# Patient Record
Sex: MALE | Race: AMERICAN INDIAN OR ALASKA NATIVE
[De-identification: names, ages, dates, MRNs, and addresses within clinical notes are randomized per-mention and may not be internally consistent; named-entity substitution may affect disease eponyms.]

---

## 2017-09-22 ENCOUNTER — HOSPITAL ENCOUNTER (EMERGENCY)
Dept: HOSPITAL 31 - C.ER | Age: 27
Discharge: HOME | End: 2017-09-22
Payer: SELF-PAY

## 2017-09-22 VITALS
DIASTOLIC BLOOD PRESSURE: 88 MMHG | SYSTOLIC BLOOD PRESSURE: 150 MMHG | HEART RATE: 59 BPM | OXYGEN SATURATION: 100 % | TEMPERATURE: 97.8 F | RESPIRATION RATE: 20 BRPM

## 2017-09-22 DIAGNOSIS — H10.9: Primary | ICD-10-CM

## 2017-09-22 NOTE — C.PDOC
History Of Present Illness


27 yr old male presents to the ER with complaints of right eye itchiness since 

yesterday. Patient states he woke up today with crusting and redness. Denies 

fever, vision changes, URI symtpoms, or headache. 


Time Seen by Provider: 09/22/17 15:21


Chief Complaint (Nursing): Eye Problem


History Per: Patient


History/Exam Limitations: no limitations


Onset/Duration Of Symptoms: Days (1)


Current Symptoms Are (Timing): Still Present





Past Medical History


Reviewed: Historical Data, Nursing Documentation, Vital Signs


Vital Signs: 


 Last Vital Signs











Temp  97.8 F   09/22/17 15:16


 


Pulse  59 L  09/22/17 15:16


 


Resp  20   09/22/17 15:16


 


BP  150/88   09/22/17 15:16


 


Pulse Ox  100   09/22/17 16:11











Family History: States: No Known Family Hx





- Social History


Hx Alcohol Use: No


Hx Substance Use: No





- Immunization History


Hx Tetanus Toxoid Vaccination: No


Hx Influenza Vaccination: No


Hx Pneumococcal Vaccination: No





Review Of Systems


Except As Marked, All Systems Reviewed And Found Negative.


Constitutional: Negative for: Fever, Chills


Eyes: Positive for: Redness, Other (Right eye itchiness ).  Negative for: 

Vision Change


Neurological: Negative for: Headache





Physical Exam





- Physical Exam


Appears: Non-toxic, No Acute Distress


Skin: Warm, Dry


Head: Atraumatic, Normacephalic


Eye(s): bilateral: PERRL, EOMI, right: Other (Mild injection. Purlent 

discharge. )


Nose: Normal


Oral Mucosa: Moist


Throat: Normal, No Erythema, No Exudate


Neck: Normal ROM, Supple


Chest: Symmetrical


Respiratory: No Accessory Muscle Use


Neurological/Psych: Oriented x3, Normal Speech





ED Course And Treatment


O2 Sat by Pulse Oximetry: 100 (RA)


Pulse Ox Interpretation: Normal


Progress Note: Patient is advised to follow up with optho in 1-2 days for 

further evaluation.





Disposition





- Disposition


Referrals: 


Vivek Neely MD [Staff Provider] - 


Disposition: HOME/ ROUTINE


Disposition Time: 15:27


Condition: STABLE


Additional Instructions: 


Follow up with primary medical doctor in 1-3 days without fail for further 

evaluation. Take medications as prescribed. Return to the emergency department 

at any time if symptoms persist or worsen.





Prescriptions: 


Tobramycin 0.3% [Tobramycin 5 Ml] 1 drop OP Q4 #1 bottle


Instructions:  Conjunctivitis (ED)


Forms:  [facility] (English)





- Clinical Impression


Clinical Impression: 


 Conjunctivitis








- PA / NP / Resident Statement


MD/DO has reviewed & agrees with the documentation as recorded.





- Scribe Statement


The provider has reviewed the documentation as recorded by the Scribe


Susan Wolfe





All medical record entries made by the Scribe were at my direction and 

personally dictated by me. I have reviewed the chart and agree that the record 

accurately reflects my personal performance of the history, physical exam, 

medical decision making, and the department course for this patient. I have 

also personally directed, reviewed, and agree with the discharge instructions 

and disposition.

## 2018-06-19 ENCOUNTER — HOSPITAL ENCOUNTER (EMERGENCY)
Dept: HOSPITAL 31 - C.ER | Age: 28
Discharge: HOME | End: 2018-06-19
Payer: COMMERCIAL

## 2018-06-19 VITALS
RESPIRATION RATE: 18 BRPM | SYSTOLIC BLOOD PRESSURE: 152 MMHG | TEMPERATURE: 97.8 F | OXYGEN SATURATION: 100 % | DIASTOLIC BLOOD PRESSURE: 79 MMHG | HEART RATE: 65 BPM

## 2018-06-19 DIAGNOSIS — S16.1XXA: Primary | ICD-10-CM
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
## 2018-06-19 PROCEDURE — 99283 EMERGENCY DEPT VISIT LOW MDM: CPT

## 2018-06-19 PROCEDURE — 96372 THER/PROPH/DIAG INJ SC/IM: CPT

## 2018-06-19 NOTE — C.PDOC
History Of Present Illness


27 year old male presents to the ED for evaluation of left-sided neck pain 

which gradually developed over the past 3 days. Patient admits he was holding 

my baby and my head was rotated and that he developed neck pain since then. Pt 

describes pain as localized over Left lateral neck area, worse with head 

rotation. Otherwise, Patient denies known direct trauma or injury, fever, chills

, headache, dizziness, vertigo, recent illness, chest pain, shortness of breath

, dyspnea, abd. pain, N/V, back pain, denies weakness, sensory or vascular 

deficits to B/L UEs. Ambulate to Ed for evaluation, not in nay apparent 

distress.


Time Seen by Provider: 06/19/18 13:08


Chief Complaint (Nursing): Upper Extremity Problem/Injury


History Per: Patient


History/Exam Limitations: no limitations


Onset/Duration Of Symptoms: Days (3), Gradual


Current Symptoms Are (Timing): Still Present


Quality: "Pain"


Additional History Per: Patient





Past Medical History


Reviewed: Historical Data, Nursing Documentation, Vital Signs


Vital Signs: 


 Last Vital Signs











Temp  97.8 F   06/19/18 13:02


 


Pulse  65   06/19/18 13:02


 


Resp  18   06/19/18 13:02


 


BP  152/79 H  06/19/18 13:02


 


Pulse Ox  100   06/19/18 14:14














- Medical History


PMH: No Chronic Diseases


Surgical History: No Surg Hx


Family History: States: Unknown Family Hx





- Social History


Hx Alcohol Use: No


Hx Substance Use: No





- Immunization History


Hx Tetanus Toxoid Vaccination: No


Hx Influenza Vaccination: No


Hx Pneumococcal Vaccination: No





Review Of Systems


Cardiovascular: Negative for: Chest Pain


Respiratory: Negative for: Shortness of Breath


Musculoskeletal: Positive for: Neck Pain (left-sided).  Negative for: Back Pain


Neurological: Negative for: Headache, Dizziness





Physical Exam





- Physical Exam


Appears: Well, Non-toxic, No Acute Distress


Skin: Normal Color, Warm, Dry, No Rash


Head: Normacephalic


Eye(s): bilateral: PERRL


Ear(s): Bilateral: Normal


Nose: No Flaring, No Discharge


Oral Mucosa: Moist, No Drooling


Throat: No Drooling


Neck: Normal ROM, Trachea Midline, No Midline Cervical Tenderness, No Step Off 

Deformity, Supple, Other (mild tenderness over left SCM muscle. No deformity, 

no skin changes.)


Chest: Symmetrical, No Deformity, No Tenderness


Cardiovascular: Rhythm Regular


Respiratory: No Decreased Breath Sounds, No Accessory Muscle Use, No Stridor, 

No Wheezing


Gastrointestinal/Abdominal: Soft, No Tenderness, No Distention, No Guarding


Back: No CVA Tenderness, No Vertebral Tenderness


Extremity: Normal ROM, No Deformity, No Swelling


Neurological/Psych: Oriented x3, Normal Speech, Normal Motor, Normal Sensation, 

Normal Reflexes





ED Course And Treatment


O2 Sat by Pulse Oximetry: 100 (on RA)


Pulse Ox Interpretation: Normal


Progress Note: Toradol IM administered.  On re-evaluation, pt is afebrile, 

hemodynamicaly stable.  Non-toxic.  Ambulatory in ED with stable gait.  head: AT

/NC.  neck: SUpple, mild tenderness over Left SCM muscle with mild spasm. no 

midline tenderness, no skin changes.  CVS: (+)S1S2, reg.  Neurologicaly intact.

  Pt has clinical finidngs c/w cervical strain, left.  Pt advised.  ref. to f/u 

with PMD in 2-3 days for re-eval.  return if any new changes.





Disposition


Counseled Patient/Family Regarding: Diagnosis, Need For Followup, Rx Given





- Disposition


Referrals: 


[facility] [Outside]


Disposition: HOME/ ROUTINE


Disposition Time: 13:27


Condition: STABLE


Additional Instructions: 


Take medication as prescribed


Follow up with PMD in 2-3 days for re-evaluation.


return to ED if any worsening or new changes.


Prescriptions: 


Ibuprofen [Motrin Tab] 600 mg PO TID #20 tab


Methocarbamol [Robaxin] 500 mg PO TID #20 tab


traMADol [Ultram] 50 mg PO TID #7 tab


Instructions:  Cervical Muscle Strain


Forms:  [facility] (English), Work Excuse





- Clinical Impression


Clinical Impression: 


 Cervical strain








- PA / NP / Resident Statement


MD/DO has reviewed & agrees with the documentation as recorded.





- Scribe Statement


The provider has reviewed the documentation as recorded by the Scribe (Mona Whittington)








All medical record entries made by the Scribe were at my direction and 

personally dictated by me. I have reviewed the chart and agree that the record 

accurately reflects my personal performance of the history, physical exam, 

medical decision making, and the department course for this patient. I have 

also personally directed, reviewed, and agree with the discharge instructions 

and disposition.